# Patient Record
Sex: FEMALE | Race: WHITE | Employment: UNEMPLOYED | URBAN - METROPOLITAN AREA
[De-identification: names, ages, dates, MRNs, and addresses within clinical notes are randomized per-mention and may not be internally consistent; named-entity substitution may affect disease eponyms.]

---

## 2020-04-10 ENCOUNTER — APPOINTMENT (OUTPATIENT)
Dept: CT IMAGING | Age: 80
DRG: 064 | End: 2020-04-10
Payer: MEDICARE

## 2020-04-10 ENCOUNTER — APPOINTMENT (OUTPATIENT)
Dept: GENERAL RADIOLOGY | Age: 80
DRG: 064 | End: 2020-04-10
Payer: MEDICARE

## 2020-04-10 ENCOUNTER — HOSPITAL ENCOUNTER (INPATIENT)
Age: 80
LOS: 2 days | Discharge: HOSPICE | DRG: 064 | End: 2020-04-12
Attending: EMERGENCY MEDICINE | Admitting: PSYCHIATRY & NEUROLOGY
Payer: MEDICARE

## 2020-04-10 PROBLEM — I61.0 BASAL GANGLIA HEMORRHAGE (HCC): Status: ACTIVE | Noted: 2020-04-10

## 2020-04-10 LAB
% CKMB: 2.3 % (ref 0–3)
ABSOLUTE EOS #: 0.06 K/UL (ref 0–0.44)
ABSOLUTE IMMATURE GRANULOCYTE: 0.04 K/UL (ref 0–0.3)
ABSOLUTE LYMPH #: 1.12 K/UL (ref 1.1–3.7)
ABSOLUTE MONO #: 0.61 K/UL (ref 0.1–1.2)
ALLEN TEST: ABNORMAL
ANION GAP SERPL CALCULATED.3IONS-SCNC: 16 MMOL/L (ref 9–17)
ANION GAP: 11 MMOL/L (ref 7–16)
BASOPHILS # BLD: 0 % (ref 0–2)
BASOPHILS ABSOLUTE: <0.03 K/UL (ref 0–0.2)
BUN BLDV-MCNC: 30 MG/DL (ref 8–23)
BUN/CREAT BLD: ABNORMAL (ref 9–20)
CALCIUM SERPL-MCNC: 9.5 MG/DL (ref 8.6–10.4)
CHLORIDE BLD-SCNC: 90 MMOL/L (ref 98–107)
CK MB: 3.1 NG/ML
CKMB INTERPRETATION: ABNORMAL
CO2: 27 MMOL/L (ref 20–31)
COLLAGEN ADENOSINE-5'-DIPHOSPHATE (ADP) TIME: 126 SEC (ref 67–112)
COLLAGEN EPINEPHRINE TIME: 120 SEC (ref 85–172)
CREAT SERPL-MCNC: 1.61 MG/DL (ref 0.5–0.9)
DIFFERENTIAL TYPE: ABNORMAL
EOSINOPHILS RELATIVE PERCENT: 1 % (ref 1–4)
FIO2: ABNORMAL
GFR AFRICAN AMERICAN: 38 ML/MIN
GFR NON-AFRICAN AMERICAN: 25 ML/MIN
GFR NON-AFRICAN AMERICAN: 31 ML/MIN
GFR SERPL CREATININE-BSD FRML MDRD: 30 ML/MIN
GFR SERPL CREATININE-BSD FRML MDRD: ABNORMAL ML/MIN/{1.73_M2}
GLUCOSE BLD-MCNC: 107 MG/DL (ref 74–100)
GLUCOSE BLD-MCNC: 114 MG/DL (ref 70–99)
HCO3 VENOUS: 30.5 MMOL/L (ref 22–29)
HCT VFR BLD CALC: 43.1 % (ref 36.3–47.1)
HEMOGLOBIN: 13.7 G/DL (ref 11.9–15.1)
IMMATURE GRANULOCYTES: 1 %
INR BLD: 1
LYMPHOCYTES # BLD: 14 % (ref 24–43)
MCH RBC QN AUTO: 28.5 PG (ref 25.2–33.5)
MCHC RBC AUTO-ENTMCNC: 31.8 G/DL (ref 28.4–34.8)
MCV RBC AUTO: 89.8 FL (ref 82.6–102.9)
MODE: ABNORMAL
MONOCYTES # BLD: 8 % (ref 3–12)
MYOGLOBIN: 145 NG/ML (ref 25–58)
NEGATIVE BASE EXCESS, VEN: ABNORMAL (ref 0–2)
NRBC AUTOMATED: 0 PER 100 WBC
O2 DEVICE/FLOW/%: ABNORMAL
O2 SAT, VEN: 54 % (ref 60–85)
PARTIAL THROMBOPLASTIN TIME: 23.9 SEC (ref 20.5–30.5)
PATIENT TEMP: ABNORMAL
PCO2, VEN: 42.2 MM HG (ref 41–51)
PDW BLD-RTO: 14.1 % (ref 11.8–14.4)
PH VENOUS: 7.47 (ref 7.32–7.43)
PLATELET # BLD: 215 K/UL (ref 138–453)
PLATELET ESTIMATE: ABNORMAL
PLATELET FUNCTION INTERP: ABNORMAL
PMV BLD AUTO: 11 FL (ref 8.1–13.5)
PO2, VEN: 27 MM HG (ref 30–50)
POC CHLORIDE: 92 MMOL/L (ref 98–107)
POC CREATININE: 1.97 MG/DL (ref 0.51–1.19)
POC HEMATOCRIT: 44 % (ref 36–46)
POC HEMOGLOBIN: 15 G/DL (ref 12–16)
POC IONIZED CALCIUM: 1 MMOL/L (ref 1.15–1.33)
POC LACTIC ACID: 0.9 MMOL/L (ref 0.56–1.39)
POC PCO2 TEMP: ABNORMAL MM HG
POC PH TEMP: ABNORMAL
POC PO2 TEMP: ABNORMAL MM HG
POC POTASSIUM: 4.4 MMOL/L (ref 3.5–4.5)
POC SODIUM: 133 MMOL/L (ref 138–146)
POSITIVE BASE EXCESS, VEN: 6 (ref 0–3)
POTASSIUM SERPL-SCNC: 3.8 MMOL/L (ref 3.7–5.3)
PROTHROMBIN TIME: 10.6 SEC (ref 9–12)
RBC # BLD: 4.8 M/UL (ref 3.95–5.11)
RBC # BLD: ABNORMAL 10*6/UL
SAMPLE SITE: ABNORMAL
SEG NEUTROPHILS: 76 % (ref 36–65)
SEGMENTED NEUTROPHILS ABSOLUTE COUNT: 5.99 K/UL (ref 1.5–8.1)
SODIUM BLD-SCNC: 133 MMOL/L (ref 135–144)
TOTAL CK: 134 U/L (ref 26–192)
TOTAL CO2, VENOUS: 32 MMOL/L (ref 23–30)
TROPONIN INTERP: ABNORMAL
TROPONIN T: ABNORMAL NG/ML
TROPONIN, HIGH SENSITIVITY: 44 NG/L (ref 0–14)
WBC # BLD: 7.8 K/UL (ref 3.5–11.3)
WBC # BLD: ABNORMAL 10*3/UL

## 2020-04-10 PROCEDURE — 82435 ASSAY OF BLOOD CHLORIDE: CPT

## 2020-04-10 PROCEDURE — 70450 CT HEAD/BRAIN W/O DYE: CPT

## 2020-04-10 PROCEDURE — 85610 PROTHROMBIN TIME: CPT

## 2020-04-10 PROCEDURE — 82803 BLOOD GASES ANY COMBINATION: CPT

## 2020-04-10 PROCEDURE — 87086 URINE CULTURE/COLONY COUNT: CPT

## 2020-04-10 PROCEDURE — 82553 CREATINE MB FRACTION: CPT

## 2020-04-10 PROCEDURE — 6360000002 HC RX W HCPCS

## 2020-04-10 PROCEDURE — 2000000003 HC NEURO ICU R&B

## 2020-04-10 PROCEDURE — 93005 ELECTROCARDIOGRAM TRACING: CPT | Performed by: EMERGENCY MEDICINE

## 2020-04-10 PROCEDURE — 85730 THROMBOPLASTIN TIME PARTIAL: CPT

## 2020-04-10 PROCEDURE — 82330 ASSAY OF CALCIUM: CPT

## 2020-04-10 PROCEDURE — 84132 ASSAY OF SERUM POTASSIUM: CPT

## 2020-04-10 PROCEDURE — 82550 ASSAY OF CK (CPK): CPT

## 2020-04-10 PROCEDURE — 2580000003 HC RX 258: Performed by: STUDENT IN AN ORGANIZED HEALTH CARE EDUCATION/TRAINING PROGRAM

## 2020-04-10 PROCEDURE — 80048 BASIC METABOLIC PNL TOTAL CA: CPT

## 2020-04-10 PROCEDURE — 84295 ASSAY OF SERUM SODIUM: CPT

## 2020-04-10 PROCEDURE — 99291 CRITICAL CARE FIRST HOUR: CPT

## 2020-04-10 PROCEDURE — 85576 BLOOD PLATELET AGGREGATION: CPT

## 2020-04-10 PROCEDURE — 83874 ASSAY OF MYOGLOBIN: CPT

## 2020-04-10 PROCEDURE — 82947 ASSAY GLUCOSE BLOOD QUANT: CPT

## 2020-04-10 PROCEDURE — 2500000003 HC RX 250 WO HCPCS: Performed by: STUDENT IN AN ORGANIZED HEALTH CARE EDUCATION/TRAINING PROGRAM

## 2020-04-10 PROCEDURE — 82565 ASSAY OF CREATININE: CPT

## 2020-04-10 PROCEDURE — 85014 HEMATOCRIT: CPT

## 2020-04-10 PROCEDURE — 84484 ASSAY OF TROPONIN QUANT: CPT

## 2020-04-10 PROCEDURE — 71045 X-RAY EXAM CHEST 1 VIEW: CPT

## 2020-04-10 PROCEDURE — 85025 COMPLETE CBC W/AUTO DIFF WBC: CPT

## 2020-04-10 PROCEDURE — 87641 MR-STAPH DNA AMP PROBE: CPT

## 2020-04-10 PROCEDURE — 83605 ASSAY OF LACTIC ACID: CPT

## 2020-04-10 RX ORDER — MANNITOL 250 MG/ML
50 INJECTION, SOLUTION INTRAVENOUS ONCE
Status: DISCONTINUED | OUTPATIENT
Start: 2020-04-11 | End: 2020-04-11

## 2020-04-10 RX ORDER — ONDANSETRON 2 MG/ML
INJECTION INTRAMUSCULAR; INTRAVENOUS
Status: COMPLETED
Start: 2020-04-10 | End: 2020-04-10

## 2020-04-10 RX ORDER — POLYETHYLENE GLYCOL 3350 17 G/17G
17 POWDER, FOR SOLUTION ORAL DAILY
Status: DISCONTINUED | OUTPATIENT
Start: 2020-04-11 | End: 2020-04-11

## 2020-04-10 RX ORDER — DOCUSATE SODIUM 100 MG/1
100 CAPSULE, LIQUID FILLED ORAL DAILY
Status: DISCONTINUED | OUTPATIENT
Start: 2020-04-11 | End: 2020-04-11

## 2020-04-10 RX ORDER — SODIUM CHLORIDE 0.9 % (FLUSH) 0.9 %
10 SYRINGE (ML) INJECTION EVERY 12 HOURS SCHEDULED
Status: DISCONTINUED | OUTPATIENT
Start: 2020-04-10 | End: 2020-04-11

## 2020-04-10 RX ORDER — ATORVASTATIN CALCIUM 80 MG/1
80 TABLET, FILM COATED ORAL NIGHTLY
Status: DISCONTINUED | OUTPATIENT
Start: 2020-04-10 | End: 2020-04-11

## 2020-04-10 RX ORDER — SODIUM CHLORIDE 0.9 % (FLUSH) 0.9 %
10 SYRINGE (ML) INJECTION PRN
Status: DISCONTINUED | OUTPATIENT
Start: 2020-04-10 | End: 2020-04-12 | Stop reason: HOSPADM

## 2020-04-10 RX ORDER — 0.9 % SODIUM CHLORIDE 0.9 %
1000 INTRAVENOUS SOLUTION INTRAVENOUS ONCE
Status: DISCONTINUED | OUTPATIENT
Start: 2020-04-10 | End: 2020-04-11

## 2020-04-10 RX ORDER — MANNITOL 20 G/100ML
1 INJECTION, SOLUTION INTRAVENOUS ONCE
Status: COMPLETED | OUTPATIENT
Start: 2020-04-10 | End: 2020-04-10

## 2020-04-10 RX ORDER — MORPHINE SULFATE 2 MG/ML
2 INJECTION, SOLUTION INTRAMUSCULAR; INTRAVENOUS
Status: DISCONTINUED | OUTPATIENT
Start: 2020-04-10 | End: 2020-04-12 | Stop reason: HOSPADM

## 2020-04-10 RX ORDER — MANNITOL 20 G/100ML
1 INJECTION, SOLUTION INTRAVENOUS ONCE
Status: DISCONTINUED | OUTPATIENT
Start: 2020-04-10 | End: 2020-04-10

## 2020-04-10 RX ORDER — ACETAMINOPHEN 325 MG/1
650 TABLET ORAL EVERY 4 HOURS PRN
Status: DISCONTINUED | OUTPATIENT
Start: 2020-04-10 | End: 2020-04-12 | Stop reason: HOSPADM

## 2020-04-10 RX ORDER — MANNITOL 20 G/100ML
50 INJECTION, SOLUTION INTRAVENOUS
Status: ACTIVE | OUTPATIENT
Start: 2020-04-10 | End: 2020-04-10

## 2020-04-10 RX ORDER — PROMETHAZINE HYDROCHLORIDE 25 MG/1
12.5 TABLET ORAL EVERY 6 HOURS PRN
Status: DISCONTINUED | OUTPATIENT
Start: 2020-04-10 | End: 2020-04-12 | Stop reason: HOSPADM

## 2020-04-10 RX ORDER — MORPHINE SULFATE 4 MG/ML
4 INJECTION, SOLUTION INTRAMUSCULAR; INTRAVENOUS
Status: DISCONTINUED | OUTPATIENT
Start: 2020-04-10 | End: 2020-04-12 | Stop reason: HOSPADM

## 2020-04-10 RX ORDER — ONDANSETRON 2 MG/ML
4 INJECTION INTRAMUSCULAR; INTRAVENOUS EVERY 6 HOURS PRN
Status: DISCONTINUED | OUTPATIENT
Start: 2020-04-10 | End: 2020-04-12 | Stop reason: HOSPADM

## 2020-04-10 RX ADMIN — DEXTROSE 5 MG/HR: 5 SOLUTION INTRAVENOUS at 19:38

## 2020-04-10 RX ADMIN — ONDANSETRON 4 MG: 2 INJECTION INTRAMUSCULAR; INTRAVENOUS at 23:00

## 2020-04-10 RX ADMIN — ONDANSETRON 4 MG: 2 INJECTION, SOLUTION INTRAMUSCULAR; INTRAVENOUS at 23:00

## 2020-04-10 RX ADMIN — DEXTROSE 5 MG/HR: 5 SOLUTION INTRAVENOUS at 18:35

## 2020-04-10 RX ADMIN — MANNITOL 77.2 G: 20 INJECTION, SOLUTION INTRAVENOUS at 20:14

## 2020-04-10 RX ADMIN — DEXTROSE 2.5 MG/HR: 5 SOLUTION INTRAVENOUS at 21:47

## 2020-04-10 ASSESSMENT — PAIN SCALES - WONG BAKER: WONGBAKER_NUMERICALRESPONSE: 0

## 2020-04-10 NOTE — H&P
sensory loss; patient is not aware of being touched in face, arm, leg  9. Best Language:  3 - mute, global aphasia; no usable speech or auditory comprehension  10. Dysarthria:  2 - severe; patient speech is so slurred as to be unintelligible in the absence of or our of proportion to any dysphagia, or is mute/anarthric   11. Extinction and Inattention:  2 - profound coreen-inattention or coreen- inattention to more than one modality. Does not recognize own hand or orients only to one side of space      TOTAL:  33        LABS AND IMAGING:     RECENT LABS:  CBC with Differential:  No results found for: WBC, RBC, HGB, HCT, PLT, MCV, MCH, MCHC, RDW, NRBC, SEGSPCT, BANDSPCT, BLASTSPCT, METASPCT, LYMPHOPCT, PROMYELOPCT, MONOPCT, MYELOPCT, EOSPCT, BASOPCT, MONOSABS, LYMPHSABS, EOSABS, BASOSABS, DIFFTYPE  BMP:    Lab Results   Component Value Date    CREATININE 1.97 04/10/2020    LABGLOM 25 04/10/2020       RADIOLOGY:   CT HEAD WO CONTRAST   Final Result   Enlarging left thalamic region hematoma with associated edema, mass effect   and midline shift. Midline shift from left-to-right measures approximately   7.8 mm at the level of the inferior 3rd ventricle. Continued follow-up recommended. CT HEAD WO CONTRAST   Final Result   Limited exam due to artifact      Left thalamic region hematoma with extension up to the level of the corona   radiata. Surrounding edema is noted. There is intraventricular extension of   hemorrhage. There is also mass effect on the 3rd ventricle with   left-to-right midline shift measures 6.5 mm      Critical results were called by Dr. Lacie Dockery to Johnnie Guzmán on 4/10/2020   at 18:38. Labs and Images reviewed with:    [x] Zaina Vaca MD    [] Sloane Alberto MD  [] Ash Alejandro MD  --[] there are no new interval images to review.      ASSESSMENT AND PLAN:         ASSESSMENT:     This is a 67 y.o. female with large right basal ganglia hemorrhage with

## 2020-04-10 NOTE — ED NOTES
Bed: 18  Expected date: 4/10/20  Expected time: 6:48 PM  Means of arrival: Mobile ICU  Comments:  AYE Lujan RN  04/10/20 [id number]

## 2020-04-10 NOTE — ED PROVIDER NOTES
Limited exam due to artifact      Left thalamic region hematoma with extension up to the level of the corona   radiata. Surrounding edema is noted. There is intraventricular extension of   hemorrhage. There is also mass effect on the 3rd ventricle with   left-to-right midline shift measures 6.5 mm      Critical results were called by Dr. Marilu Dewitt to Xin Dominguez on 4/10/2020   at 18:38. CT HEAD WO CONTRAST    (Results Pending)   CTA HEAD NECK W CONTRAST    (Results Pending)         EKG  EKG Interpretation    Interpreted by emergency department physician    Rhythm: Atrial sensed ventricular paced  Rate: normal  Axis: normal  Ectopy: none  Conduction: normal  ST Segments: no acute change  T Waves: no acute change  Q Waves: none    Clinical Impression: no acute changes     Karon Situ      All EKG's are interpreted by the [facility] Physician who either signs or Co-signs this chart in the absence of a cardiologist.    EMERGENCY DEPARTMENT COURSE:  Patient seen and evaluated by myself and attending. ED Course as of Apr 10 2012   Fri Apr 10, 2020   1937 Neurosurgery resident had discussion with family regarding wishes. They are requesting DNR CCA at this time. Patient continues to maintain airway. CT scan pending    [BW]   1952 Neurosurgery discussing the case with family. [BW]   1955 GFR 25, per neurosurgery resident CTAs canceled for concerns of contrast-induced nephropathy    [BW]   2012 Mannitol being infused per neurosurgery recommendation    [BW]      ED Course User Index  [BW] Karon Situ, DO     Patient admitted to neuro critical care. PROCEDURES:  None    CONSULTS:  IP CONSULT TO NEUROSURGERY      FINAL IMPRESSION      1. SAH (subarachnoid hemorrhage) ([facility] [address]          DISPOSITION / PLAN     DISPOSITION Decision To Admit 04/10/2020 07:16:58 PM      PATIENT REFERRED TO:  No follow-up provider specified.     DISCHARGE MEDICATIONS:  New Prescriptions    No medications on

## 2020-04-10 NOTE — ED NOTES
Dr. Heraclio Rabago and dr. Ashley Hall in consultation room in triage updating family     Marcello Meraz RN  04/10/20 [id number]

## 2020-04-10 NOTE — CONSULTS
Department of Neurosurgery                                            Nurse Practitioner Consult Note      Reason for Consult:  Large basal ganglia bleed  Requesting Physician:  José Miguel/Carola  Neurosurgeon:   [] Dr. Juany Medina  [] Dr. Charli Riley  [x] Dr. Jordy Rausch  [] Dr. Babs Arnold      History Obtained From:  [facility]    CHIEF COMPLAINT:         Right sided weakness and altered mental status    HISTORY OF PRESENT ILLNESS:       The patient is a 67 y.o. female who presents with right sided flaccidity and altered mental status. LKW approx 1500. Mobile stroke unit responded and CT head showed large basal ganglia hemorrhage with intraventricular extension. On arrival to the ED, patient continued to have right sided weakness with some internal rotation of the RUE, possibly posturing. Pupils 3mm and sluggishly reactive. Not following commands or answering questions. Globally aphasic. PAST MEDICAL HISTORY :       Past Medical History:    History reviewed. No pertinent past medical history. Past Surgical History:    History reviewed. No pertinent surgical history.     Social History:   Social History     Socioeconomic History    Marital status: Not on file     Spouse name: Not on file    Number of children: Not on file    Years of education: Not on file    Highest education level: Not on file   Occupational History    Not on file   Social Needs    Financial resource strain: Not on file    Food insecurity     Worry: Not on file     Inability: Not on file    Transportation needs     Medical: Not on file     Non-medical: Not on file   Tobacco Use    Smoking status: Not on file   Substance and Sexual Activity    Alcohol use: Not on file    Drug use: Not on file    Sexual activity: Not on file   Lifestyle    Physical activity     Days per week: Not on file     Minutes per session: Not on file    Stress: Not on file   Relationships    Social connections     Talks on phone: Not on file     Gets to any dysphagia, or is mute/anarthric   11. Extinction and Inattention:  2 - profound coreen-inattention or coreen- inattention to more than one modality. Does not recognize own hand or orients only to one side of space     TOTAL:  33        LABS AND IMAGING:     CBC with Differential:  No results found for: WBC, RBC, HGB, HCT, PLT, MCV, MCH, MCHC, RDW, NRBC, SEGSPCT, BANDSPCT, BLASTSPCT, METASPCT, LYMPHOPCT, PROMYELOPCT, MONOPCT, MYELOPCT, EOSPCT, BASOPCT, MONOSABS, LYMPHSABS, EOSABS, BASOSABS, DIFFTYPE  BMP:  No results found for: NA, K, CL, CO2, BUN, LABALBU, CREATININE, CALCIUM, GFRAA, LABGLOM, GLUCOSE    Radiology Review:    CT HEAD WO CONTRAST   Final Result   Enlarging left thalamic region hematoma with associated edema, mass effect   and midline shift. Midline shift from left-to-right measures approximately   7.8 mm at the level of the inferior 3rd ventricle. Continued follow-up recommended. CT HEAD WO CONTRAST   Final Result   Limited exam due to artifact      Left thalamic region hematoma with extension up to the level of the corona   radiata. Surrounding edema is noted. There is intraventricular extension of   hemorrhage. There is also mass effect on the 3rd ventricle with   left-to-right midline shift measures 6.5 mm      Critical results were called by Dr. Joseph Montero to Gallo Haley on 4/10/2020   at 18:38. ASSESSMENT AND PLAN:     There is no problem list on file for this patient. A/P:  This is a 67 y.o. female with large left basal ganglia bleed  Patient care will be discussed with attending, will reevaluated patient along with attending.     - Discussed code status with family and they state that she has a living will that states she is DNR-CCA and would not like intubation. They will be bringing in the documentation shortly.    - No neurosurgical interventions planned for now  - HOB: 30 degrees   - Obtain CT head in AM   - Neuro checks per protocol  - Hold all antiplatelets and anticoagulants  - We recommend SBP < 140   - Determine the lower limit of SBP clinically based on mentation      Additional recommendations may follow    Please contact neurosurgery with any changes in patients neurologic status. Thank you for your consult.        Ro Cartwright MD   NS pager [phone]  4/10/2020  7:12 PM

## 2020-04-11 LAB
EKG ATRIAL RATE: 67 BPM
EKG P AXIS: 67 DEGREES
EKG P-R INTERVAL: 108 MS
EKG Q-T INTERVAL: 502 MS
EKG QRS DURATION: 150 MS
EKG QTC CALCULATION (BAZETT): 530 MS
EKG R AXIS: 133 DEGREES
EKG T AXIS: 64 DEGREES
EKG VENTRICULAR RATE: 67 BPM

## 2020-04-11 PROCEDURE — 2500000003 HC RX 250 WO HCPCS: Performed by: STUDENT IN AN ORGANIZED HEALTH CARE EDUCATION/TRAINING PROGRAM

## 2020-04-11 PROCEDURE — 6360000002 HC RX W HCPCS: Performed by: STUDENT IN AN ORGANIZED HEALTH CARE EDUCATION/TRAINING PROGRAM

## 2020-04-11 PROCEDURE — 99221 1ST HOSP IP/OBS SF/LOW 40: CPT | Performed by: NURSE PRACTITIONER

## 2020-04-11 PROCEDURE — 2700000000 HC OXYGEN THERAPY PER DAY

## 2020-04-11 PROCEDURE — 1200000000 HC SEMI PRIVATE

## 2020-04-11 PROCEDURE — 94761 N-INVAS EAR/PLS OXIMETRY MLT: CPT

## 2020-04-11 PROCEDURE — 2580000003 HC RX 258: Performed by: STUDENT IN AN ORGANIZED HEALTH CARE EDUCATION/TRAINING PROGRAM

## 2020-04-11 PROCEDURE — 99223 1ST HOSP IP/OBS HIGH 75: CPT | Performed by: PSYCHIATRY & NEUROLOGY

## 2020-04-11 RX ORDER — ACETAMINOPHEN 650 MG/1
650 SUPPOSITORY RECTAL EVERY 4 HOURS PRN
Status: DISCONTINUED | OUTPATIENT
Start: 2020-04-11 | End: 2020-04-12 | Stop reason: HOSPADM

## 2020-04-11 RX ORDER — GLYCOPYRROLATE 0.2 MG/ML
0.1 INJECTION INTRAMUSCULAR; INTRAVENOUS EVERY 4 HOURS PRN
Status: DISCONTINUED | OUTPATIENT
Start: 2020-04-11 | End: 2020-04-12 | Stop reason: HOSPADM

## 2020-04-11 RX ORDER — LORAZEPAM 2 MG/ML
0.5 INJECTION INTRAMUSCULAR EVERY 4 HOURS PRN
Status: DISCONTINUED | OUTPATIENT
Start: 2020-04-11 | End: 2020-04-12 | Stop reason: HOSPADM

## 2020-04-11 RX ADMIN — SODIUM CHLORIDE 2.5 MG/HR: 9 INJECTION, SOLUTION INTRAVENOUS at 10:33

## 2020-04-11 RX ADMIN — SODIUM CHLORIDE, PRESERVATIVE FREE 10 ML: 5 INJECTION INTRAVENOUS at 00:02

## 2020-04-11 RX ADMIN — MORPHINE SULFATE 2 MG: 2 INJECTION, SOLUTION INTRAMUSCULAR; INTRAVENOUS at 14:55

## 2020-04-11 RX ADMIN — SODIUM CHLORIDE 2.5 MG/HR: 9 INJECTION, SOLUTION INTRAVENOUS at 03:42

## 2020-04-11 RX ADMIN — SODIUM CHLORIDE 5 MG/HR: 9 INJECTION, SOLUTION INTRAVENOUS at 00:06

## 2020-04-11 ASSESSMENT — PAIN SCALES - WONG BAKER: WONGBAKER_NUMERICALRESPONSE: 0

## 2020-04-11 ASSESSMENT — PAIN SCALES - GENERAL: PAINLEVEL_OUTOF10: 7

## 2020-04-11 NOTE — ED NOTES
approximately 4.2 cm. Maximum craniocaudal dimension is approximately 4.1 cm. Maximum transverse dimension is approximately 4.1 cm. This hematoma extends up to the posterior margin of the bodies of the lateral ventricles at the corona radiata level. Surrounding edema is noted. Associated mass effect and midline shift is noted. At the level of the inferior 3rd ventricle, midline shift is approximately 7.8 mm. There is extension of the hematoma into the midbrain and perhaps the posterior upper chinyere. A large amount of edema extends into the midbrain and chinyere. Intraventricular hemorrhage is noted. Mild ventricular enlargement is noted. This is slightly increased. Posterior fossa detail is limited due to artifact. 4th ventricle is midline. There is no definitive acute infarct ORBITS: No acute orbital abnormality is noted SINUSES: The visualized paranasal sinuses and mastoid air cells demonstrate no acute abnormality. SOFT TISSUES/SKULL:  No acute abnormality of the visualized skull or soft tissues. Enlarging left thalamic region hematoma with associated edema, mass effect and midline shift. Midline shift from left-to-right measures approximately 7.8 mm at the level of the inferior 3rd ventricle. Continued follow-up recommended. Ct Head Wo Contrast    Result Date: 4/10/2020  EXAMINATION: CT OF THE HEAD WITHOUT CONTRAST  4/10/2020 6:13 pm TECHNIQUE: CT of the head was performed without the administration of intravenous contrast. Dose modulation, iterative reconstruction, and/or weight based adjustment of the mA/kV was utilized to reduce the radiation dose to as low as reasonably achievable. COMPARISON: None. HISTORY: ORDERING SYSTEM PROVIDED HISTORY: Stroke TECHNOLOGIST PROVIDED HISTORY: Stroke FINDINGS: BRAIN/VENTRICLES: Detail is limited due to large amount of artifact. Focal left thalamic region hematoma is noted measuring approximately 2.6 x 2.6 cm.  There is mass effect on the 3rd ventricle with Weakness? 3 Tongue Asymmetry or Weakness? 4 Palatal Asymmetry or Weakness? 5 Signs of aspiration during 3oz water test?*                 If answers to questions 1-4 are NO proceed to 3oz water test               *Administer 3oz of water for sequential drinks, note any throat clearing, cough, or change in vocal quality immediately after and 1 minute following the swallow. If answers to questions 1-5 are NO proceed with ordered PO meds       POC LABS      TIME NA     Test (reference range)      FSBG ()      PT (11-13.5)      INR (0.8-1.1)      Na (138-146)      K (3.5-4.9)      Cl ()      iCa (1.2-1.32)      TCO2 (24-29)      Glu ()      BUN (8.0-26)      Crea (0.6-1. 3)      Hct (38-51)      Hb (12.0-17)      AnGap 10.0-20)                Assistance:   []    Fire -    []    Police    []    Other EMS (See Below)          Hospital Notification:    [facility] -  [x] [facility]  [] [facility]  [] [facility]  [] [facility]  [] [facility] [] [facility] [] [facility] [] [facility]  [] [employer]     [x]    Med Channel:     []    Cell Phone     Med Control Orders Received:   [x] No  []  Yes:     Med Control Physician (if on line medical direction received)  -        Hospital Team Alert:   []    Trauma Alert    []    STEMI Alert         [x]    Stroke Alert    [x]    RACE Alert      Description Of Valuables: Hearing aid x's 1. Placed in clear plastic bag.  Left on counter in pt room in ER 18    Patient Valuables:   [x]    With Patient    []    Not Received    [x]    ER / Lab     Call Outcome:   [x]    Transport to Facility    []    Care Transferred to [facility]    []    Cancelled    []    Patient Refusal    []                           Mobile Stroke Unit    Electronically signed by Gareth Rasheed RN on 4/10/20 at 9:18 PM EDT     Gareth Rasheed RN  04/10/20 [id number]

## 2020-04-11 NOTE — ED NOTES
Family at bedside with patient. No distress noted or needs expressed at this time. Will continue to monitor.       Bhanu Pineda RN  04/10/20 0621

## 2020-04-11 NOTE — PLAN OF CARE
Patient remained free from injury. Patient verbalized understanding of need for the safety precautions. Demonstrates proper use of assistive devices. Bed remains in the lowest position. Call light remains within reach. [place]alling [place] Program in use. Neuro assessment completed, fall precautions in place, aspirations precautions in place, assess for barriers in communication and mobility, interventions to assist in communication and mobility in place, encouraged to call for assistance, adaptive devices used as needed, assess emotional state and support offered, encouraged patient to communicate by available means, and support systems included in patient care. Patient's rights, dignity, safety, and comfort maintained. Physiological integrity of all systems maintained.      Problem: Pain:  Goal: Pain level will decrease  Description: Pain level will decrease  Outcome: Met This Shift     Problem: Pressure Ulcer:  Goal: Absence of pressure ulcer  Description: Absence of pressure ulcer  Outcome: Met This Shift  Goal: Decrease in pressure ulcer size  Description: Decrease in pressure ulcer size  Outcome: Met This Shift  Goal: Signs of wound healing will improve  Description: Signs of wound healing will improve  Outcome: Met This Shift     Problem: Falls - Risk of:  Goal: Will remain free from falls  Description: Will remain free from falls  Outcome: Met This Shift  Goal: Absence of physical injury  Description: Absence of physical injury  Outcome: Met This Shift     Problem: HEMODYNAMIC STATUS  Goal: Patient has stable vital signs and fluid balance  Outcome: Ongoing     Problem: Breathing Pattern - Ineffective:  Goal: Able to breathe comfortably  Description: Able to breathe comfortably  Outcome: Ongoing  Goal: Absence of respiratory distress  Description: Absence of respiratory distress  Outcome: Ongoing  Goal: Ability to achieve and maintain a regular respiratory rate will improve  Description: Ability to achieve and maintain a regular respiratory rate will improve  Outcome: Ongoing  Goal: Minimal restlessness  Description: Minimal restlessness  Outcome: Ongoing     Problem: Mood - Altered:  Goal: Mood stable  Description: Mood stable  Outcome: Ongoing  Note: Patient nonverbal, unable to express self. Goal: Level of anxiety will decrease  Description: Level of anxiety will decrease  Outcome: Ongoing  Note: Patient nonverbal, unable to express self. Problem: ACTIVITY INTOLERANCE/IMPAIRED MOBILITY  Goal: Mobility/activity is maintained at optimum level for patient  Outcome: Not Met This Shift     Problem: COMMUNICATION IMPAIRMENT  Goal: Ability to express needs and understand communication  Outcome: Not Met This Shift  Note: Patient nonverbal, unable to express self. Problem: Discharge Planning:  Goal: Participates in care planning  Description: Participates in care planning  Outcome: Not Met This Shift     Problem: Bowel Function - Altered:  Goal: Bowel elimination without discomfort  Description: Bowel elimination without discomfort  Outcome: Not Met This Shift     Problem: Grieving:  Goal: Verbalizes feelings, concerns and thoughts  Description: Verbalizes feelings, concerns and thoughts  Outcome: Not Met This Shift  Note: Patient nonverbal, unable to express self. Goal: Able to identify stages of grief  Description: Able to identify stages of grief  Outcome: Not Met This Shift  Note: Patient nonverbal, unable to express self. Problem: Mood - Altered:  Goal: Verbalizations of depressive symptoms will decrease  Description: Verbalizations of depressive symptoms will decrease  Outcome: Not Met This Shift  Note: Patient nonverbal, unable to express self.      Problem: Nutrition Deficit:  Goal: Ability to achieve adequate nutritional intake will improve  Description: Ability to achieve adequate nutritional intake will improve  Outcome: Not Met This Shift     Problem: Pain:  Goal: Recognizes and communicates

## 2020-04-12 VITALS
HEART RATE: 84 BPM | RESPIRATION RATE: 31 BRPM | OXYGEN SATURATION: 88 % | WEIGHT: 167.33 LBS | SYSTOLIC BLOOD PRESSURE: 166 MMHG | TEMPERATURE: 98.4 F | DIASTOLIC BLOOD PRESSURE: 56 MMHG

## 2020-04-12 LAB
CULTURE: NO GROWTH
Lab: NORMAL
MRSA, DNA, NASAL: NORMAL
SPECIMEN DESCRIPTION: NORMAL
SPECIMEN DESCRIPTION: NORMAL

## 2020-04-12 PROCEDURE — 99238 HOSP IP/OBS DSCHRG MGMT 30/<: CPT | Performed by: PSYCHIATRY & NEUROLOGY

## 2020-04-12 NOTE — DISCHARGE SUMMARY
Neuro Critical Care   Discharge Summary      PATIENT NAME: Richardson Aguilar  YOB: 1940  MEDICAL RECORD NO. [id number]  DATE: 4/12/2020  PRIMARY CARE PHYSICIAN: No primary care provider on file. DISCHARGE DATE:  4/12/20  DISCHARGE DIAGNOSIS:   Patient Active Problem List   Diagnosis Code    [facility] I61.0    Cerebral hemorrhage (HCC) I61.9       Roxy Mcdonald is a 67 y.o. yo female with history of HTN, Parkinson's, CHF s/p AICD, HLD, CABG, TIA, and CVA who presented to [facility] on 4/10/2020  7:07 PM as a transfer from Mobile Stroke unit for global aphasia and right sided weakness. LKW 1500 while at home with family, patient exhibited acute onset of symptoms. CT head by MSU revealed left thalamic intraparenchymal hemorrhage with intraventricular extension, ICH score 2. Patient's GCS was 6 in the ER, but patient was protecting her airway on nasal cannula. Mannitol 77g given IV for cerebral edema. Cardene was started for hypertension to keep SBP < 140. She was admitted to the Neuro ICU, neurosurgery evaluated patient and did not recommend any intervention. Family discussion held regarding patient's medical history, diagnosis, prognosis, and patient's wishes. Family reports that patient would not want any type of intervention and state that she has been suffering a long time. Palliative care was consulted and code status was changed to [facility]. Hospice was consulted and patient will be discharged home with hospice care. [employer] rep came to hospital to turn off pacemaker/AICD. At time of discharge, Richardson Aguilar was tolerating a Diet NPO Effective Now and is in stable condition to be discharged to home with hospice. PROCEDURES:    NA    PHYSICAL EXAMINATION        Discharge Vitals:  weight is 167 lb 5.3 oz (75.9 kg). Her axillary temperature is 98.4 °F (36.9 °C). Her blood pressure is 166/56 (abnormal) and her pulse is 84.  Her respiration is 31 (abnormal) visualized skull or soft tissues. Limited exam due to artifact Left thalamic region hematoma with extension up to the level of the corona radiata. Surrounding edema is noted. There is intraventricular extension of hemorrhage. There is also mass effect on the 3rd ventricle with left-to-right midline shift measures 6.5 mm Critical results were called by Dr. Angel Oliveros to Kristine Soto on 4/10/2020 at 18:38. Xr Chest Portable    Result Date: 4/10/2020  EXAMINATION: ONE XRAY VIEW OF THE CHEST 4/10/2020 10:35 pm COMPARISON: None. HISTORY: ORDERING SYSTEM PROVIDED HISTORY: ICH TECHNOLOGIST PROVIDED HISTORY: [facility] Reason for Exam: upr Dyspnea FINDINGS: Marginal inspiration is noted. Borderline cardiomegaly is present. ICD is in place. Vascular markings are mildly engorged. Small left pleural effusion is present. Old fracture deformity seen involving the right humeral neck. Borderline cardiomegaly, with mild vascular congestion         DISCHARGE INSTRUCTIONS     Discharge Medications:        Medication List      You have not been prescribed any medications.        DNR-CC  Discharged home with hospice    OLEGARIO Gonzalez CNP  Neuro Critical Care  4/12/2020, 11:26 AM

## 2020-04-12 NOTE — CARE COORDINATION
Discharge [facility] Case Management Department  Written by: Krish Lucio RN    Patient Name: Jefferson Resendiz  Attending Provider: Deyvi Gardner MD  Admit Date: 4/10/2020  7:07 PM  MRN: [id number]  Account: [de-identified]                     : 1940  Discharge Date: 2020      Disposition: home with family and Gray Lizarraga RN

## 2020-04-12 NOTE — PROGRESS NOTES
Neuro Critical Care Fellow needs to be informed when the [employer]'s Representative comes to deactivate the patient's pacemaker and AICD.    This is per Dr. Izabella Felipe
CAD, CHF, CABG who presents with hypertensive left thalamic intraparenchymal hemorrhage with IVH, ICH score 2. Code status changed to [facility] with hospice consult, plan to discharge home with hospice      Hospice consult  Palliative following for comfort measures   - Morphine 2-4 mg q4h prn   - Ativan 0.5 mg q4h prn   - Glycopyrrolate 0.1 mg q4h prn  Continue nasal cannula for comfort      DISPOSITION:  Okay to transfer to Med/surg    We will continue to follow along. For any changes in exam or patient status please contact Neuro Critical Care.       OLEGARIO Pal - CNP  Neuro Critical Care  Pager [phone]  4/11/2020     12:34 PM